# Patient Record
Sex: MALE | Race: OTHER
[De-identification: names, ages, dates, MRNs, and addresses within clinical notes are randomized per-mention and may not be internally consistent; named-entity substitution may affect disease eponyms.]

---

## 2017-08-24 ENCOUNTER — HOSPITAL ENCOUNTER (EMERGENCY)
Dept: HOSPITAL 61 - ER | Age: 22
Discharge: LEFT BEFORE DISCHARGE | End: 2017-08-24
Payer: SELF-PAY

## 2017-08-24 VITALS — DIASTOLIC BLOOD PRESSURE: 55 MMHG | SYSTOLIC BLOOD PRESSURE: 125 MMHG

## 2017-08-24 VITALS — BODY MASS INDEX: 41.47 KG/M2 | WEIGHT: 280 LBS | HEIGHT: 69 IN

## 2017-08-24 DIAGNOSIS — R11.2: Primary | ICD-10-CM

## 2017-08-24 DIAGNOSIS — Z87.01: ICD-10-CM

## 2017-08-24 DIAGNOSIS — E66.01: ICD-10-CM

## 2017-08-24 DIAGNOSIS — Z53.21: ICD-10-CM

## 2017-08-24 LAB
ALBUMIN SERPL-MCNC: 4 G/DL (ref 3.4–5)
ALBUMIN/GLOB SERPL: 0.9 {RATIO} (ref 1–1.7)
ALP SERPL-CCNC: 126 U/L (ref 46–116)
ALT SERPL-CCNC: 41 U/L (ref 16–63)
ANION GAP SERPL CALC-SCNC: 10 MMOL/L (ref 6–14)
APTT BLD: 29 SEC (ref 24–38)
AST SERPL-CCNC: 29 U/L (ref 15–37)
BACTERIA #/AREA URNS HPF: (no result) /HPF
BASOPHILS # BLD AUTO: 0.1 X10^3/UL (ref 0–0.2)
BASOPHILS NFR BLD: 1 % (ref 0–3)
BILIRUB SERPL-MCNC: 0.5 MG/DL (ref 0.2–1)
BILIRUB UR QL STRIP: NEGATIVE
BUN SERPL-MCNC: 12 MG/DL (ref 8–26)
BUN/CREAT SERPL: 12 (ref 6–20)
CALCIUM SERPL-MCNC: 9 MG/DL (ref 8.5–10.1)
CHLORIDE SERPL-SCNC: 102 MMOL/L (ref 98–107)
CO2 SERPL-SCNC: 27 MMOL/L (ref 21–32)
CREAT SERPL-MCNC: 1 MG/DL (ref 0.7–1.3)
EOSINOPHIL NFR BLD: 13 % (ref 0–3)
ERYTHROCYTE [DISTWIDTH] IN BLOOD BY AUTOMATED COUNT: 13.9 % (ref 11.5–14.5)
GFR SERPLBLD BASED ON 1.73 SQ M-ARVRAT: 94.3 ML/MIN
GLOBULIN SER-MCNC: 4.3 G/DL (ref 2.2–3.8)
GLUCOSE SERPL-MCNC: 95 MG/DL (ref 70–99)
GLUCOSE UR STRIP-MCNC: NEGATIVE MG/DL
HCT VFR BLD CALC: 46 % (ref 39–53)
HGB BLD-MCNC: 15.8 G/DL (ref 13–17.5)
INR PPP: 1.1 (ref 0.8–1.1)
LYMPHOCYTES # BLD: 2.1 X10^3/UL (ref 1–4.8)
LYMPHOCYTES NFR BLD AUTO: 26 % (ref 24–48)
MCH RBC QN AUTO: 26 PG (ref 25–35)
MCHC RBC AUTO-ENTMCNC: 34 G/DL (ref 31–37)
MCV RBC AUTO: 77 FL (ref 79–100)
MONOCYTES NFR BLD: 6 % (ref 0–9)
NEUTROPHILS NFR BLD AUTO: 54 % (ref 31–73)
NITRITE UR QL STRIP: NEGATIVE
PH UR STRIP: 6 [PH]
PLATELET # BLD AUTO: 262 X10^3/UL (ref 140–400)
POTASSIUM SERPL-SCNC: 3.9 MMOL/L (ref 3.5–5.1)
PROT SERPL-MCNC: 8.3 G/DL (ref 6.4–8.2)
PROT UR STRIP-MCNC: NEGATIVE MG/DL
PROTHROMBIN TIME: 13.7 SEC (ref 11.7–14)
RBC # BLD AUTO: 5.97 X10^6/UL (ref 4.3–5.7)
RBC #/AREA URNS HPF: 0 /HPF (ref 0–2)
SODIUM SERPL-SCNC: 139 MMOL/L (ref 136–145)
SP GR UR STRIP: >=1.03
SQUAMOUS #/AREA URNS LPF: (no result) /LPF
UROBILINOGEN UR-MCNC: 0.2 MG/DL
WBC # BLD AUTO: 8.2 X10^3/UL (ref 4–11)
WBC #/AREA URNS HPF: (no result) /HPF (ref 0–4)

## 2017-08-24 PROCEDURE — 83690 ASSAY OF LIPASE: CPT

## 2017-08-24 PROCEDURE — 80053 COMPREHEN METABOLIC PANEL: CPT

## 2017-08-24 PROCEDURE — 85730 THROMBOPLASTIN TIME PARTIAL: CPT

## 2017-08-24 PROCEDURE — 81001 URINALYSIS AUTO W/SCOPE: CPT

## 2017-08-24 PROCEDURE — 85025 COMPLETE CBC W/AUTO DIFF WBC: CPT

## 2017-08-24 PROCEDURE — 96361 HYDRATE IV INFUSION ADD-ON: CPT

## 2017-08-24 PROCEDURE — 85610 PROTHROMBIN TIME: CPT

## 2017-08-24 PROCEDURE — 99285 EMERGENCY DEPT VISIT HI MDM: CPT

## 2017-08-24 PROCEDURE — 96374 THER/PROPH/DIAG INJ IV PUSH: CPT

## 2017-08-24 PROCEDURE — 36415 COLL VENOUS BLD VENIPUNCTURE: CPT

## 2017-08-24 NOTE — PHYS DOC
Past Medical History


Past Medical History:  No Pertinent History


Additional Past Medical Histor:  pneumonia


Past Surgical History:  No Surgical History


Alcohol Use:  None


Drug Use:  None





Adult General


Chief Complaint


Chief Complaint:  HEMATEMESIS/VOMITING BLOOD





[facility]


HPI





22-year-old male with history of morbid obesity from [facility] now presents to 

the emergency department complaining of vomiting once and suspecting there 

might be a small amount of blood in his vomitus. She does not have a history of 

abdominal problems or chronic gastritis. He states he does not drink alcohol at 

all. Patient is on no medicines. He gets some nausea this morning and retched 

and vomited one time results of which were as described. No mable blood patient 

suspected trace of blood present. He denies black or bloody stool. He has no 

history of coagulopathy and does not take any anticoagulants area patient has 

no dizziness near syncope or syncope. Denies abdominal pain. He is currently 

completely asymptomatic with no nausea at all. Patient has never been told he 

has any medical problems





Review of Systems


Review of Systems





Constitutional: Denies fever or chills []


Eyes: Denies change in visual acuity, redness, or eye pain []


HENT: Denies nasal congestion or sore throat []


Respiratory: Denies cough or shortness of breath []


Cardiovascular: No additional information not addressed in HPI []


GI: Denies abdominal pain, nausea, vomiting, bloody stools or diarrhea []


: Denies dysuria or hematuria []


Musculoskeletal: Denies back pain or joint pain []


Integument: Denies rash or skin lesions []


Neurologic: Denies headache, focal weakness or sensory changes []


Endocrine: Denies polyuria or polydipsia []





Current Medications


Current Medications





Current Medications








 Medications


  (Trade)  Dose


 Ordered  Sig/Aurelia  Start Time


 Stop Time Status Last Admin


Dose Admin


 


 Ondansetron HCl


  (Zofran)  4 mg  1X  ONCE  8/24/17 10:00


 8/24/17 10:01 DC 8/24/17 10:12


4 MG


 


 Sodium Chloride  1,000 ml @ 


 999 mls/hr  Q1H1M  8/24/17 09:57


 8/24/17 10:57 DC 8/24/17 10:12


999 MLS/HR











Allergies


Allergies





Allergies








Coded Allergies Type Severity Reaction Last Updated Verified


 


  No Known Drug Allergies    5/29/16 No











Physical Exam


Physical Exam


Well-appearing morbidly obese crenation male in no acute distress. No pallor of 

conjunctiva. Nontender abdomen specifically no epigastric tenderness. Clear 

lungs regular rhythm no tachycardia completely benign extended abdominal exam 

with no CVA tenderness. Normal extremities


Constitutional: Well developed, well nourished, no acute distress, non-toxic 

appearance. []


HENT: Normocephalic, atraumatic, bilateral external ears normal, oropharynx 

moist, no oral exudates, nose normal. []


Eyes: PERRLA, EOMI, conjunctiva normal, no discharge. [] 


Neck: Normal range of motion, no tenderness, supple, no stridor. [] 


Cardiovascular:Heart rate regular rhythm, no murmur []


Lungs & Thorax:  Bilateral breath sounds clear to auscultation []


Abdomen: Bowel sounds normal, soft, no tenderness, no masses, no pulsatile 

masses. [] 


Skin: Warm, dry, no erythema, no rash. [] 


Back: No tenderness, no CVA tenderness. [] 


Extremities: No tenderness, no cyanosis, no clubbing, ROM intact, no edema. [] 


Neurologic: Alert and oriented X 3, normal motor function, normal sensory 

function, no focal deficits noted. []


Psychologic: Affect normal, judgement normal, mood normal. []





Current Patient Data


Vital Signs





 Vital Signs








  Date Time  Temp Pulse Resp B/P (MAP) Pulse Ox O2 Delivery O2 Flow Rate FiO2


 


8/24/17 12:07  70 16 125/55 (78) 98 Room Air  


 


8/24/17 09:12 98.4       





 98.4       








Lab Values





 Laboratory Tests








Test


  8/24/17


09:55 8/24/17


10:29 8/24/17


11:00


 


White Blood Count


  8.2 x10^3/uL


(4.0-11.0) 


  


 


 


Red Blood Count


  5.97 x10^6/uL


(4.30-5.70)  H 


  


 


 


Hemoglobin


  15.8 g/dL


(13.0-17.5) 


  


 


 


Hematocrit


  46.0 %


(39.0-53.0) 


  


 


 


Mean Corpuscular Volume


  77 fL ()


L 


  


 


 


Mean Corpuscular Hemoglobin 26 pg (25-35)    


 


Mean Corpuscular Hemoglobin


Concent 34 g/dL


(31-37) 


  


 


 


Red Cell Distribution Width


  13.9 %


(11.5-14.5) 


  


 


 


Platelet Count


  262 x10^3/uL


(140-400) 


  


 


 


Neutrophils (%) (Auto) 54 % (31-73)    


 


Lymphocytes (%) (Auto) 26 % (24-48)    


 


Monocytes (%) (Auto) 6 % (0-9)    


 


Eosinophils (%) (Auto) 13 % (0-3)  H  


 


Basophils (%) (Auto) 1 % (0-3)    


 


Neutrophils # (Auto)


  4.4 x10^3uL


(1.8-7.7) 


  


 


 


Lymphocytes # (Auto)


  2.1 x10^3/uL


(1.0-4.8) 


  


 


 


Monocytes # (Auto)


  0.5 x10^3/uL


(0.0-1.1) 


  


 


 


Eosinophils # (Auto)


  1.1 x10^3/uL


(0.0-0.7)  H 


  


 


 


Basophils # (Auto)


  0.1 x10^3/uL


(0.0-0.2) 


  


 


 


Sodium Level


  139 mmol/L


(136-145) 


  


 


 


Potassium Level


  3.9 mmol/L


(3.5-5.1) 


  


 


 


Chloride Level


  102 mmol/L


() 


  


 


 


Carbon Dioxide Level


  27 mmol/L


(21-32) 


  


 


 


Anion Gap 10 (6-14)    


 


Blood Urea Nitrogen


  12 mg/dL


(8-26) 


  


 


 


Creatinine


  1.0 mg/dL


(0.7-1.3) 


  


 


 


Estimated GFR


(Cockcroft-Gault) 94.3  


  


  


 


 


BUN/Creatinine Ratio 12 (6-20)    


 


Glucose Level


  95 mg/dL


(70-99) 


  


 


 


Calcium Level


  9.0 mg/dL


(8.5-10.1) 


  


 


 


Total Bilirubin


  0.5 mg/dL


(0.2-1.0) 


  


 


 


Aspartate Amino Transferase


(AST) 29 U/L (15-37)


  


  


 


 


Alanine Aminotransferase (ALT)


  41 U/L (16-63)


  


  


 


 


Alkaline Phosphatase


  126 U/L


()  H 


  


 


 


Total Protein


  8.3 g/dL


(6.4-8.2)  H 


  


 


 


Albumin


  4.0 g/dL


(3.4-5.0) 


  


 


 


Albumin/Globulin Ratio


  0.9 (1.0-1.7)


L 


  


 


 


Lipase


  99 U/L


() 


  


 


 


Prothrombin Time


  


  13.7 SEC


(11.7-14.0) 


 


 


Prothrombin Time INR  1.1 (0.8-1.1)   


 


PTT


  


  29 SEC (24-38)


  


 


 


Urine Color   Yellow  


 


Urine Clarity   Clear  


 


Urine pH   6.0  


 


Urine Specific Gravity   >=1.030  


 


Urine Protein


  


  


  Negative mg/dL


(NEG-TRACE)


 


Urine Glucose (UA)


  


  


  Negative mg/dL


(NEG)


 


Urine Ketones (Stick)


  


  


  Negative mg/dL


(NEG)


 


Urine Blood


  


  


  Negative (NEG)


 


 


Urine Nitrite


  


  


  Negative (NEG)


 


 


Urine Bilirubin


  


  


  Negative (NEG)


 


 


Urine Urobilinogen Dipstick


  


  


  0.2 mg/dL (0.2


mg/dL)


 


Urine Leukocyte Esterase


  


  


  Negative (NEG)


 


 


Urine RBC   0 /HPF (0-2)  


 


Urine WBC


  


  


  Occ /HPF (0-4)


 


 


Urine Squamous Epithelial


Cells 


  


  Few /LPF  


 


 


Urine Bacteria


  


  


  Few /HPF


(0-FEW)


 


Urine Mucus   Mod /LPF  





 Laboratory Tests


8/24/17 09:55








 Laboratory Tests


8/24/17 09:55














EKG


EKG


[]





Radiology/Procedures


Radiology/Procedures


[]





Course & Med Decision Making


Course & Med Decision Making


Pertinent Labs and Imaging studies reviewed. (See chart for details)


Patient status post single episode of vomiting. He now feels well. Question of 

small amount of blood in vomitus however patient has no history of coagulopathy 

or anticoagulation. He is refusing a rectal exam with nurse present. Explained 

to patient this was necessary for his workup as supportive information to 

suggest or deny that he may have gastritis or maybe evolving peptic ulcer 

disease. Despite excellent oxygenation patient continues to refuse rectal exam. 

Hemoglobin unremarkable, vital signs stable. Workup benign. Patient continues 

to refuse rectal exam and he is aware his workup is incomplete without it. He 

will sign out AGAINST MEDICAL ADVICE and follow-up with his doctor for 

reevaluation and further workup and treatment as needed. 





[]





Dragon Disclaimer


Dragon Disclaimer


This electronic medical record was generated, in whole or in part, using a 

voice recognition dictation system.





Departure


Departure


Impression:  


 Primary Impression:  


 Emesis


Disposition:  07 AGAINST MEDICAL ADVICE


Condition:  STABLE


Referrals:  


NO PCP (PCP)





Additional Instructions:  


All up with your doctor as soon as possible for reevaluation and further workup 

as needed











SUSY SEPULVEDA MD Aug 24, 2017 13:10

## 2018-12-02 ENCOUNTER — HOSPITAL ENCOUNTER (EMERGENCY)
Dept: HOSPITAL 61 - ER | Age: 23
LOS: 1 days | Discharge: HOME | End: 2018-12-03
Payer: SELF-PAY

## 2018-12-02 VITALS — WEIGHT: 280 LBS | HEIGHT: 72 IN | BODY MASS INDEX: 37.93 KG/M2

## 2018-12-02 DIAGNOSIS — M54.6: ICD-10-CM

## 2018-12-02 DIAGNOSIS — R00.0: ICD-10-CM

## 2018-12-02 DIAGNOSIS — R07.89: Primary | ICD-10-CM

## 2018-12-02 LAB
ALBUMIN SERPL-MCNC: 4.1 G/DL (ref 3.4–5)
ALBUMIN/GLOB SERPL: 0.8 {RATIO} (ref 1–1.7)
ALP SERPL-CCNC: 115 U/L (ref 46–116)
ALT SERPL-CCNC: 48 U/L (ref 16–63)
ANION GAP SERPL CALC-SCNC: 12 MMOL/L (ref 6–14)
AST SERPL-CCNC: 23 U/L (ref 15–37)
BASOPHILS # BLD AUTO: 0.1 X10^3/UL (ref 0–0.2)
BASOPHILS NFR BLD: 1 % (ref 0–3)
BILIRUB SERPL-MCNC: 0.5 MG/DL (ref 0.2–1)
BUN SERPL-MCNC: 13 MG/DL (ref 8–26)
BUN/CREAT SERPL: 13 (ref 6–20)
CALCIUM SERPL-MCNC: 9.9 MG/DL (ref 8.5–10.1)
CHLORIDE SERPL-SCNC: 100 MMOL/L (ref 98–107)
CK SERPL-CCNC: 202 U/L (ref 39–308)
CO2 SERPL-SCNC: 25 MMOL/L (ref 21–32)
CREAT SERPL-MCNC: 1 MG/DL (ref 0.7–1.3)
EOSINOPHIL NFR BLD: 0.3 X10^3/UL (ref 0–0.7)
EOSINOPHIL NFR BLD: 2 % (ref 0–3)
ERYTHROCYTE [DISTWIDTH] IN BLOOD BY AUTOMATED COUNT: 13.8 % (ref 11.5–14.5)
GFR SERPLBLD BASED ON 1.73 SQ M-ARVRAT: 93.4 ML/MIN
GLOBULIN SER-MCNC: 5.3 G/DL (ref 2.2–3.8)
GLUCOSE SERPL-MCNC: 95 MG/DL (ref 70–99)
HCT VFR BLD CALC: 51.2 % (ref 39–53)
HGB BLD-MCNC: 17.4 G/DL (ref 13–17.5)
LIPASE: 90 U/L (ref 73–393)
LYMPHOCYTES # BLD: 2.4 X10^3/UL (ref 1–4.8)
LYMPHOCYTES NFR BLD AUTO: 17 % (ref 24–48)
MCH RBC QN AUTO: 27 PG (ref 25–35)
MCHC RBC AUTO-ENTMCNC: 34 G/DL (ref 31–37)
MCV RBC AUTO: 79 FL (ref 79–100)
MONO #: 0.8 X10^3/UL (ref 0–1.1)
MONOCYTES NFR BLD: 6 % (ref 0–9)
NEUT #: 10.4 X10^3UL (ref 1.8–7.7)
NEUTROPHILS NFR BLD AUTO: 74 % (ref 31–73)
PLATELET # BLD AUTO: 325 X10^3/UL (ref 140–400)
POTASSIUM SERPL-SCNC: 3.8 MMOL/L (ref 3.5–5.1)
PROT SERPL-MCNC: 9.4 G/DL (ref 6.4–8.2)
RBC # BLD AUTO: 6.47 X10^6/UL (ref 4.3–5.7)
SODIUM SERPL-SCNC: 137 MMOL/L (ref 136–145)
WBC # BLD AUTO: 14 X10^3/UL (ref 4–11)

## 2018-12-02 PROCEDURE — 85025 COMPLETE CBC W/AUTO DIFF WBC: CPT

## 2018-12-02 PROCEDURE — 96374 THER/PROPH/DIAG INJ IV PUSH: CPT

## 2018-12-02 PROCEDURE — 82553 CREATINE MB FRACTION: CPT

## 2018-12-02 PROCEDURE — 99284 EMERGENCY DEPT VISIT MOD MDM: CPT

## 2018-12-02 PROCEDURE — 83690 ASSAY OF LIPASE: CPT

## 2018-12-02 PROCEDURE — 93005 ELECTROCARDIOGRAM TRACING: CPT

## 2018-12-02 PROCEDURE — 85379 FIBRIN DEGRADATION QUANT: CPT

## 2018-12-02 PROCEDURE — 80053 COMPREHEN METABOLIC PANEL: CPT

## 2018-12-02 PROCEDURE — 71046 X-RAY EXAM CHEST 2 VIEWS: CPT

## 2018-12-02 PROCEDURE — 36415 COLL VENOUS BLD VENIPUNCTURE: CPT

## 2018-12-02 PROCEDURE — 83880 ASSAY OF NATRIURETIC PEPTIDE: CPT

## 2018-12-02 PROCEDURE — 84484 ASSAY OF TROPONIN QUANT: CPT

## 2018-12-02 NOTE — PHYS DOC
Past Medical History


Past Medical History:  No Pertinent History


Additional Past Medical Histor:  pneumonia


Past Surgical History:  No Surgical History


Alcohol Use:  None


Drug Use:  None





Adult General


Chief Complaint


Chief Complaint:  CHEST PAIN





HPI


HPI





23-year-old male presents to ER via POV for complaints of sudden onset of left-

sided chest pain radiating into his back 2 hours prior to arrival. Patient's 

uncle at bedside is translating as patient is preferring not to use translation 

phone and he speaks primarily Chukese. Patient reports he was resting when pain 

started and pain has been constant. Pt denies increased pain w/movement or 

breathing. Pt denies fever, SOA, cough, abd pain, N/V, or palpitations. He 

denies swelling in extremities. He denies any recent travel or illness. Pt's 

brother denies any family hx of CAD/MIs. 





Pt took 800mg Ibuprofen PTA to ER. He denies smoking, etoh, or illicit drug use.





Review of Systems


Review of Systems





Constitutional: Denies fever or chills []


Eyes: Denies change in visual acuity, redness, or eye pain []


HENT: Denies nasal congestion or sore throat []


Respiratory: Denies cough or shortness of breath []


Cardiovascular: Reports lt side CP radiating into lt side upper back


GI: Denies abdominal pain, nausea, vomiting, bloody stools or diarrhea []


: Denies dysuria or hematuria []


Musculoskeletal: Denies neck pain or joint pain []


Integument: Denies rash, swelling or skin lesions []


Neurologic: Denies headache, focal weakness or sensory changes. Denies dizziness

/lightheadedness





All other systems were reviewed and found to be within normal limits, except as 

documented in this note.





Current Medications


Current Medications





Current Medications








 Medications


  (Trade)  Dose


 Ordered  Sig/Aurelia  Start Time


 Stop Time Status Last Admin


Dose Admin


 


 Ketorolac


 Tromethamine


  (Toradol 15mg


 Vial)  15 mg  1X  ONCE  12/2/18 23:45


 12/2/18 23:45 DC  





 


 Morphine Sulfate


  (Morphine


 Sulfate)  4 mg  1X  ONCE  12/2/18 23:55


 12/2/18 23:56 DC 12/3/18 00:03


4 MG


 


 Sodium Chloride  1,000 ml @ 


 1,000 mls/hr  1X  ONCE  12/2/18 23:30


 12/3/18 00:29 DC 12/2/18 23:30


1,000 MLS/HR











Allergies


Allergies





Allergies








Coded Allergies Type Severity Reaction Last Updated Verified


 


  No Known Drug Allergies    5/29/16 No











Physical Exam


Physical Exam





Constitutional: Well developed, well nourished, mild distress on initial exam- 

flushes facial skin tone w/clammy skin, non-toxic appearance. []


HENT: Normocephalic, atraumatic, bilateral ears normal, oropharynx moist, no 

oral exudates, nose normal. []


Eyes: PERRLA, conjunctiva normal, no discharge. [] 


Neck: Normal range of motion, no tenderness, supple, no stridor. [] 


Cardiovascular: Tachycardic heart rate regular rhythm, no murmur []


Lungs & Thorax:  Bilateral breath sounds clear to auscultation. Resp. equal/

nonlabored


Abdomen: Bowel sounds normal, soft/obese, no tenderness/rigidity, no masses, no 

pulsatile masses. [] 


Skin: Warm, clammy, no erythema, no rash. [] 


Back: No tenderness, no CVA tenderness. [] 


Extremities: No tenderness, no cyanosis, no clubbing, ROM intact, no edema. 

Bilat. radial 2+ equal. Dorsal pedis/pedal pulse equal bilat. 2+. Calf size 

symmetric bilat. with no calf tenderness


Neurologic: Alert and oriented X 3, normal motor function, normal sensory 

function, no focal deficits noted. []


Psychologic: Affect normal, judgement normal, mood normal. []





Current Patient Data


Vital Signs





 Vital Signs








  Date Time  Temp Pulse Resp B/P (MAP) Pulse Ox O2 Delivery O2 Flow Rate FiO2


 


12/3/18 01:31  73 18 143/77 (99) 95 Room Air  


 


12/2/18 22:15 98.6       





 98.6       








Lab Values





 Laboratory Tests








Test


 12/2/18


22:50


 


White Blood Count


 14.0 x10^3/uL


(4.0-11.0)  H


 


Red Blood Count


 6.47 x10^6/uL


(4.30-5.70)  H


 


Hemoglobin


 17.4 g/dL


(13.0-17.5)


 


Hematocrit


 51.2 %


(39.0-53.0)


 


Mean Corpuscular Volume


 79 fL ()





 


Mean Corpuscular Hemoglobin 27 pg (25-35)  


 


Mean Corpuscular Hemoglobin


Concent 34 g/dL


(31-37)


 


Red Cell Distribution Width


 13.8 %


(11.5-14.5)


 


Platelet Count


 325 x10^3/uL


(140-400)


 


Neutrophils (%) (Auto) 74 % (31-73)  H


 


Lymphocytes (%) (Auto) 17 % (24-48)  L


 


Monocytes (%) (Auto) 6 % (0-9)  


 


Eosinophils (%) (Auto) 2 % (0-3)  


 


Basophils (%) (Auto) 1 % (0-3)  


 


Neutrophils # (Auto)


 10.4 x10^3uL


(1.8-7.7)  H


 


Lymphocytes # (Auto)


 2.4 x10^3/uL


(1.0-4.8)


 


Monocytes # (Auto)


 0.8 x10^3/uL


(0.0-1.1)


 


Eosinophils # (Auto)


 0.3 x10^3/uL


(0.0-0.7)


 


Basophils # (Auto)


 0.1 x10^3/uL


(0.0-0.2)


 


D-Dimer (Sarita)


 < 0.27


ug/mlFEU


 


Sodium Level


 137 mmol/L


(136-145)


 


Potassium Level


 3.8 mmol/L


(3.5-5.1)


 


Chloride Level


 100 mmol/L


()


 


Carbon Dioxide Level


 25 mmol/L


(21-32)


 


Anion Gap 12 (6-14)  


 


Blood Urea Nitrogen


 13 mg/dL


(8-26)


 


Creatinine


 1.0 mg/dL


(0.7-1.3)


 


Estimated GFR


(Cockcroft-Gault) 93.4  





 


BUN/Creatinine Ratio 13 (6-20)  


 


Glucose Level


 95 mg/dL


(70-99)


 


Calcium Level


 9.9 mg/dL


(8.5-10.1)


 


Total Bilirubin


 0.5 mg/dL


(0.2-1.0)


 


Aspartate Amino Transferase


(AST) 23 U/L (15-37)





 


Alanine Aminotransferase (ALT)


 48 U/L (16-63)





 


Alkaline Phosphatase


 115 U/L


()


 


Creatine Kinase


 202 U/L


()


 


Creatine Kinase MB (Mass)


 1.4 ng/mL


(0.0-3.6)


 


Creatine Kinase MB Relative


Index 0.7 % (0-4)  





 


Troponin I Quantitative


 < 0.017 ng/mL


(0.000-0.055)


 


NT-Pro-B-Type Natriuretic


Peptide < 5 pg/mL


(0-124)


 


Total Protein


 9.4 g/dL


(6.4-8.2)  H


 


Albumin


 4.1 g/dL


(3.4-5.0)


 


Albumin/Globulin Ratio


 0.8 (1.0-1.7)


L


 


Lipase


 90 U/L


()





 Laboratory Tests


12/2/18 22:50








 Laboratory Tests


12/2/18 22:50














EKG


EKG


EKG obtained 12/02/18 at 2240


Interpreted by Dr. Yip





Sinus rhythm


Rate 100


No STEMI





Radiology/Procedures


Radiology/Procedures


CXR:  no acute findings





Course & Med Decision Making


Course & Med Decision Making


Pertinent Labs and Imaging studies reviewed. (See chart for details)





2325: On re-eval pt is in no visible distress and skin is not clammy as he was 

at time of arrival. He appears less anxious then at time of initial exam with 

equal/nonlabored resp. His /63 HR 93 RR 18 O2 sat 99% on RA. Test results 

were discussed with pt's brother translating again to pt. Will give pt pain meds

/IV fld bolus and continue to monitor. DDimer 0.27; EKG with no STEMI/acute ST 

elevation and troponin <0.017; WBCs 14.0 no bands. Pt continues to report pain 

to be same in left side chest denying any SOA, abd pain, or nausea. He reports 

pain does continue to radiate into lt side upper back. 





Pt's case was discussed with Dr. Yip who viewed chest xray- no obvious 

acute findings. 





0005: On re-eval. following IV Morphine pt reports no change in lt side CP. 

Discussed plans to recheck troponin 3 hrs after initial labs to further r/o ACS

- pt and family agreeable with this plan. 





0050: Re-eval and pt reports CP has subsided. He is resting on ER cart- in no 

distress/nontoxic in appearance. VS signs stable with /76 HR 88 RR 18 O2 

sat 98% RA- he remains on cardiac monitor with no ischemic changes. He denies 

any sxs at this time. Family at bedside. Discussed pt's case with Dr. Yip 

who will assume pt's care with 3 hr troponin pending. 








02:15:  Repeat trop/EKG negative for acute changes.  Patient discharged to 

home.  Advised to return to the ER for any new or worsening symptoms.





Dragon Disclaimer


Dragon Disclaimer


This electronic medical record was generated, in whole or in part, using a 

voice recognition dictation system.





Departure


Departure


Disposition:  01 HOME, SELF-CARE


Condition:  GOOD


Referrals:  


NO PCP (PCP)











LINDSAY GUERRA Dec 2, 2018 23:37


PARAMJIT YIP DO Dec 3, 2018 02:17

## 2018-12-03 ENCOUNTER — HOSPITAL ENCOUNTER (OUTPATIENT)
Dept: HOSPITAL 61 - ER | Age: 23
Setting detail: OBSERVATION
LOS: 1 days | Discharge: HOME | End: 2018-12-04
Attending: INTERNAL MEDICINE | Admitting: INTERNAL MEDICINE
Payer: SELF-PAY

## 2018-12-03 VITALS — SYSTOLIC BLOOD PRESSURE: 143 MMHG | DIASTOLIC BLOOD PRESSURE: 77 MMHG

## 2018-12-03 VITALS — HEIGHT: 68 IN | BODY MASS INDEX: 41.52 KG/M2 | WEIGHT: 274 LBS

## 2018-12-03 DIAGNOSIS — R07.89: Primary | ICD-10-CM

## 2018-12-03 DIAGNOSIS — F41.9: ICD-10-CM

## 2018-12-03 DIAGNOSIS — K76.0: ICD-10-CM

## 2018-12-03 LAB
ALBUMIN SERPL-MCNC: 3.7 G/DL (ref 3.4–5)
ALBUMIN/GLOB SERPL: 0.8 {RATIO} (ref 1–1.7)
ALP SERPL-CCNC: 101 U/L (ref 46–116)
ALT SERPL-CCNC: 47 U/L (ref 16–63)
ANION GAP SERPL CALC-SCNC: 11 MMOL/L (ref 6–14)
AST SERPL-CCNC: 26 U/L (ref 15–37)
BASOPHILS # BLD AUTO: 0.1 X10^3/UL (ref 0–0.2)
BASOPHILS NFR BLD: 1 % (ref 0–3)
BILIRUB SERPL-MCNC: 0.6 MG/DL (ref 0.2–1)
BUN SERPL-MCNC: 13 MG/DL (ref 8–26)
BUN/CREAT SERPL: 14 (ref 6–20)
CALCIUM SERPL-MCNC: 9.3 MG/DL (ref 8.5–10.1)
CHLORIDE SERPL-SCNC: 101 MMOL/L (ref 98–107)
CO2 SERPL-SCNC: 27 MMOL/L (ref 21–32)
CREAT SERPL-MCNC: 0.9 MG/DL (ref 0.7–1.3)
EOSINOPHIL NFR BLD: 0.6 X10^3/UL (ref 0–0.7)
EOSINOPHIL NFR BLD: 5 % (ref 0–3)
ERYTHROCYTE [DISTWIDTH] IN BLOOD BY AUTOMATED COUNT: 14.1 % (ref 11.5–14.5)
GFR SERPLBLD BASED ON 1.73 SQ M-ARVRAT: 105.5 ML/MIN
GLOBULIN SER-MCNC: 4.8 G/DL (ref 2.2–3.8)
GLUCOSE SERPL-MCNC: 81 MG/DL (ref 70–99)
HCT VFR BLD CALC: 47.5 % (ref 39–53)
HGB BLD-MCNC: 15.6 G/DL (ref 13–17.5)
LYMPHOCYTES # BLD: 2.6 X10^3/UL (ref 1–4.8)
LYMPHOCYTES NFR BLD AUTO: 23 % (ref 24–48)
MCH RBC QN AUTO: 26 PG (ref 25–35)
MCHC RBC AUTO-ENTMCNC: 33 G/DL (ref 31–37)
MCV RBC AUTO: 80 FL (ref 79–100)
MONO #: 0.7 X10^3/UL (ref 0–1.1)
MONOCYTES NFR BLD: 6 % (ref 0–9)
NEUT #: 7.4 X10^3UL (ref 1.8–7.7)
NEUTROPHILS NFR BLD AUTO: 65 % (ref 31–73)
PLATELET # BLD AUTO: 294 X10^3/UL (ref 140–400)
POTASSIUM SERPL-SCNC: 3.7 MMOL/L (ref 3.5–5.1)
PROT SERPL-MCNC: 8.5 G/DL (ref 6.4–8.2)
RBC # BLD AUTO: 5.94 X10^6/UL (ref 4.3–5.7)
SODIUM SERPL-SCNC: 139 MMOL/L (ref 136–145)
WBC # BLD AUTO: 11.4 X10^3/UL (ref 4–11)

## 2018-12-03 PROCEDURE — 36415 COLL VENOUS BLD VENIPUNCTURE: CPT

## 2018-12-03 PROCEDURE — G0379 DIRECT REFER HOSPITAL OBSERV: HCPCS

## 2018-12-03 PROCEDURE — 90471 IMMUNIZATION ADMIN: CPT

## 2018-12-03 PROCEDURE — 71260 CT THORAX DX C+: CPT

## 2018-12-03 PROCEDURE — 93005 ELECTROCARDIOGRAM TRACING: CPT

## 2018-12-03 PROCEDURE — 96374 THER/PROPH/DIAG INJ IV PUSH: CPT

## 2018-12-03 PROCEDURE — 96376 TX/PRO/DX INJ SAME DRUG ADON: CPT

## 2018-12-03 PROCEDURE — 80053 COMPREHEN METABOLIC PANEL: CPT

## 2018-12-03 PROCEDURE — G0378 HOSPITAL OBSERVATION PER HR: HCPCS

## 2018-12-03 PROCEDURE — 99284 EMERGENCY DEPT VISIT MOD MDM: CPT

## 2018-12-03 PROCEDURE — 90756 CCIIV4 VACC ABX FREE IM: CPT

## 2018-12-03 PROCEDURE — 84484 ASSAY OF TROPONIN QUANT: CPT

## 2018-12-03 PROCEDURE — 85025 COMPLETE CBC W/AUTO DIFF WBC: CPT

## 2018-12-03 NOTE — PHYS DOC
Past Medical History


Past Medical History:  No Pertinent History


Additional Past Medical Histor:  pneumonia


Past Surgical History:  No Surgical History


Alcohol Use:  None


Drug Use:  Marijuana





Adult General


Chief Complaint


Chief Complaint:  CHEST PAIN-NON CARDIAC NATURE





HPI


HPI





23-year-old male turns to ER via POV with complaints of left side chest pain 

radiating into his left upper back. Patient was evaluated in the ER by this 

provider last night and he presented with same symptoms. His uncle who is 

translating is at bedside states pain per pt has worsened . He reports patient 

took ibuprofen at 5 PM with no relief in symptoms.





Review of Systems


Review of Systems





Constitutional: Denies fever or chills []


Eyes: Denies change in visual acuity, redness, or eye pain []


HENT: Denies nasal congestion or sore throat []


Respiratory: Denies cough or shortness of breath []


Cardiovascular: Reports lt side CP into lt side upper back- pain is worse with 

deep breath. Some increased pain with palp. lt upper chest


GI: Denies abdominal pain, nausea, vomiting, bloody stools or diarrhea []


: Denies dysuria or hematuria []


Musculoskeletal: Denies neck pain or joint pain []


Integument: Denies rash, swelling or skin lesions []


Neurologic: Denies headache, focal weakness or sensory changes []








All other systems were reviewed and found to be within normal limits, except as 

documented in this note.





Current Medications


Current Medications





Current Medications








 Medications


  (Trade)  Dose


 Ordered  Sig/Aurelia  Start Time


 Stop Time Status Last Admin


Dose Admin


 


 Info


  (CONTRAST GIVEN


 -- Rx MONITORING)  1 each  PRN DAILY  PRN  12/3/18 21:00


 12/5/18 20:59   





 


 Iohexol


  (Omnipaque 300


 Mg/ml)  75 ml  1X  ONCE  12/3/18 20:45


 12/3/18 20:46 DC 12/3/18 20:45


75 ML


 


 Morphine Sulfate


  (Morphine


 Sulfate)  4 mg  1X  ONCE  12/3/18 20:45


 12/3/18 20:46 DC 12/3/18 21:34


4 MG


 


 Sodium Chloride  1,000 ml @ 


 1,000 mls/hr  1X  ONCE  12/3/18 20:45


 12/3/18 21:44 DC 12/3/18 21:34


1,000 MLS/HR











Allergies


Allergies





Allergies








Coded Allergies Type Severity Reaction Last Updated Verified


 


  No Known Drug Allergies    5/29/16 No











Physical Exam


Physical Exam





Constitutional: Well developed, well nourished, no acute distress, non-toxic 

appearance. []


HENT: Normocephalic, atraumatic, oropharynx moist, no oral exudates, nose 

normal. []


Eyes: PERRLA, EOMI, conjunctiva normal, no discharge. [] 


Neck: Normal range of motion, no tenderness, supple, no stridor. [] 


Cardiovascular: Heart rate regular rhythm, no murmur []


Lungs & Thorax:  Bilateral breath sounds clear to auscultation []


Abdomen: Bowel sounds normal, soft, no tenderness, no masses, no pulsatile 

masses. [] 


Skin: Warm, dry, no erythema, no rash. [] 


Back: No tenderness, no CVA tenderness. [] 


Extremities: No tenderness, no cyanosis, no clubbing, ROM intact, no edema. [] 


Neurologic: Alert and oriented X 3, normal motor function, normal sensory 

function, no focal deficits noted. []


Psychologic: Affect normal, judgement normal, mood normal. []





Current Patient Data


Vital Signs





 Vital Signs








  Date Time  Temp Pulse Resp B/P (MAP) Pulse Ox O2 Delivery O2 Flow Rate FiO2


 


12/3/18 21:38  79 14 155/84 (107) 98 Room Air  


 


12/3/18 20:22 98.4       





 98.4       








Lab Values





 Laboratory Tests








Test


 12/3/18


21:35


 


White Blood Count


 11.4 x10^3/uL


(4.0-11.0)  H


 


Red Blood Count


 5.94 x10^6/uL


(4.30-5.70)  H


 


Hemoglobin


 15.6 g/dL


(13.0-17.5)


 


Hematocrit


 47.5 %


(39.0-53.0)


 


Mean Corpuscular Volume


 80 fL ()





 


Mean Corpuscular Hemoglobin 26 pg (25-35)  


 


Mean Corpuscular Hemoglobin


Concent 33 g/dL


(31-37)


 


Red Cell Distribution Width


 14.1 %


(11.5-14.5)


 


Platelet Count


 294 x10^3/uL


(140-400)


 


Neutrophils (%) (Auto) 65 % (31-73)  


 


Lymphocytes (%) (Auto) 23 % (24-48)  L


 


Monocytes (%) (Auto) 6 % (0-9)  


 


Eosinophils (%) (Auto) 5 % (0-3)  H


 


Basophils (%) (Auto) 1 % (0-3)  


 


Neutrophils # (Auto)


 7.4 x10^3uL


(1.8-7.7)


 


Lymphocytes # (Auto)


 2.6 x10^3/uL


(1.0-4.8)


 


Monocytes # (Auto)


 0.7 x10^3/uL


(0.0-1.1)


 


Eosinophils # (Auto)


 0.6 x10^3/uL


(0.0-0.7)


 


Basophils # (Auto)


 0.1 x10^3/uL


(0.0-0.2)


 


Sodium Level


 139 mmol/L


(136-145)


 


Potassium Level


 3.7 mmol/L


(3.5-5.1)


 


Chloride Level


 101 mmol/L


()


 


Carbon Dioxide Level


 27 mmol/L


(21-32)


 


Anion Gap 11 (6-14)  


 


Blood Urea Nitrogen


 13 mg/dL


(8-26)


 


Creatinine


 0.9 mg/dL


(0.7-1.3)


 


Estimated GFR


(Cockcroft-Gault) 105.5  





 


BUN/Creatinine Ratio 14 (6-20)  


 


Glucose Level


 81 mg/dL


(70-99)


 


Calcium Level


 9.3 mg/dL


(8.5-10.1)


 


Total Bilirubin


 0.6 mg/dL


(0.2-1.0)


 


Aspartate Amino Transferase


(AST) 26 U/L (15-37)





 


Alanine Aminotransferase (ALT)


 47 U/L (16-63)





 


Alkaline Phosphatase


 101 U/L


()


 


Troponin I Quantitative


 < 0.017 ng/mL


(0.000-0.055)


 


Total Protein


 8.5 g/dL


(6.4-8.2)  H


 


Albumin


 3.7 g/dL


(3.4-5.0)


 


Albumin/Globulin Ratio


 0.8 (1.0-1.7)


L





 Laboratory Tests


12/3/18 21:35








 Laboratory Tests


12/3/18 21:35











EKG


EKG


EKG obtained 12/3/18 


Interpreted by Dr. Yip





Sinus rhythm


Nonspecific ST- T wave abnorm.


Rate 86


No STEMI





Similar to comparison with EKGs from 12/2/18





Radiology/Procedures


Radiology/Procedures


PROCEDURE: CT CHEST W/CONTRAST





CT chest with contrast dated 12/3/2018.


 


No comparison available.


 


Clinical data indication: Left-sided chest pain.


 


TECHNIQUE:


 


Contiguous axial imaging of the chest performed following the intravenous 


administration of 75 cc Omnipaque 300. 


One or more of the following individualized dose reduction techniques were


utilized for this examination:  


1. Automated exposure control  


2. Adjustment of the mA and/or kV according to patient size  


3. Use of iterative reconstruction technique.


 


FINDINGS:


 


Heart size within normal limits. No pericardial effusion. No mediastinal, 


hilar or axillary lymphadenopathy. There is some increased density in the 


anterior superior mediastinum that likely represents residual thymic 


tissue.


 


Central airways are patent. Lungs are clear. No consolidation or pleural 


effusion. No pneumothorax.


 


Images of upper abdomen show diffuse low-density of the liver, compatible 


with fatty infiltration. No apparent mass. Biliary tree normal in caliber.


 


Bone windows show no acute findings.


 


IMPRESSION:


1. No acute abnormality of chest. Clear lungs.


2. Mild fatty infiltration of the liver.


 


Electronically signed by: Peter Diaz MD (12/3/2018 9:10 PM) 


[facility]














DICTATED and SIGNED BY:     PETER DIAZ MD


DATE:     12/03/18 2107





Course & Med Decision Making


Course & Med Decision Making


Pertinent Labs and Imaging studies reviewed. (See chart for details)





2300: There was delay with lab in getting pt's lab results- EKG similar to 

those obtained during last night's ER visit and troponin again was <0.017. 

Chest CT with no acute findings. Results were discussed with pt and his uncle- 

they have concerns of ongoing pain and are wanting admit to hosp. as discussion 

was had with outpt care with PCP. At this time pt is unemployed and has no PCP. 

Discussed providing pain med and Rx for steroids- Uncle and pt both are wanting 

him admitted as pt feels pain is worse and he has had no improvement with 

Ibuprofen or Morphine. During this discussion pt is in no visible distress/

nontoxic in appearance. Further discussed labs from today's ER visit and last 

night's along with xray/CT results with no acute findings. Both are not wanting 

pt to be discharge and admitted for further monitoring w/Uncle reporting pt's 

pain was worse today. Will admit to hospitalist and have cardiology see pt 

tomorrow. At time of admit pt was in no distress with equal/nonlabored resp. 





Pt's case and plan of care was discussed with Dr. Yip.





Matilde Disclaimer


Matilde Disclaimer


This electronic medical record was generated, in whole or in part, using a 

voice recognition dictation system.





Departure


Departure


Impression:  


 Primary Impression:  


 Chest pain


Disposition:  09 ADMITTED AS INPATIENT


Admitting Physician:  Loy Ohara


Condition:  STABLE


Referrals:  


NO PCP (PCP)











LINDSAY GUERRA Dec 3, 2018 20:33

## 2018-12-03 NOTE — RAD
CT chest with contrast dated 12/3/2018.

 

No comparison available.

 

Clinical data indication: Left-sided chest pain.

 

TECHNIQUE:

 

Contiguous axial imaging of the chest performed following the intravenous 

administration of 75 cc Omnipaque 300. 

One or more of the following individualized dose reduction techniques were

utilized for this examination:  

1. Automated exposure control  

2. Adjustment of the mA and/or kV according to patient size  

3. Use of iterative reconstruction technique.

 

FINDINGS:

 

Heart size within normal limits. No pericardial effusion. No mediastinal, 

hilar or axillary lymphadenopathy. There is some increased density in the 

anterior superior mediastinum that likely represents residual thymic 

tissue.

 

Central airways are patent. Lungs are clear. No consolidation or pleural 

effusion. No pneumothorax.

 

Images of upper abdomen show diffuse low-density of the liver, compatible 

with fatty infiltration. No apparent mass. Biliary tree normal in caliber.

 

Bone windows show no acute findings.

 

IMPRESSION:

1. No acute abnormality of chest. Clear lungs.

2. Mild fatty infiltration of the liver.

 

Electronically signed by: Peter Diaz MD (12/3/2018 9:10 PM) 

[facility]

## 2018-12-03 NOTE — RAD
Chest, 2 views, 12/2/2018:

 

HISTORY: Chest pain

 

Comparison is made to a study from 3/30/2016. The heart size and pulmonary

vascularity are normal. No pulmonary infiltrate is seen. There is no 

evidence of pleural fluid.

 

IMPRESSION: No acute cardiopulmonary abnormality is detected.

 

Electronically signed by: Rick Moritz, MD (12/3/2018 7:46 AM) [facility]

## 2018-12-03 NOTE — EKG
[facility]

              [address] [place] [address]

Test Date:    2018               Test Time:    02:07:38

Pat Name:     JANET MARIN               Department:   

Patient ID:   [id number]           Room:          

Gender:       M                        Technician:   

:          1995               Requested By: PARAMJIT CLAIRE

Order Number: [id number]           Reading MD:   Willy Funk

                                 Measurements

Intervals                              Axis          

Rate:         78                       P:            0

MT:           176                      QRS:          33

QRSD:         100                      T:            4

QT:           362                                    

QTc:          416                                    

                           Interpretive Statements

SINUS RHYTHM

NON SPECIFIC ST-T ABNORMALITY (ELEVATION)



Electronically Signed On 12-3-2018 9:17:32 CST by Willy Funk

## 2018-12-03 NOTE — EKG
[facility]

              [address] [place] [address]

Test Date:    2018               Test Time:    22:40:55

Pat Name:     JANET MARIN               Department:   

Patient ID:   [id number]           Room:          

Gender:       M                        Technician:   

:          1995               Requested By: LINDSAY GUERRA

Order Number: [id number]           Reading MD:   Willy Funk

                                 Measurements

Intervals                              Axis          

Rate:         100                      P:            28

IN:           164                      QRS:          48

QRSD:         90                       T:            15

QT:           334                                    

QTc:          434                                    

                           Interpretive Statements

SINUS RHYTHM

NO SPECIFIC ECG ABNORMALITIES



Electronically Signed On 12-3-2018 9:16:55 CST by Willy Funk

## 2018-12-04 VITALS — DIASTOLIC BLOOD PRESSURE: 79 MMHG | SYSTOLIC BLOOD PRESSURE: 134 MMHG

## 2018-12-04 VITALS — SYSTOLIC BLOOD PRESSURE: 128 MMHG | DIASTOLIC BLOOD PRESSURE: 67 MMHG

## 2018-12-04 VITALS — SYSTOLIC BLOOD PRESSURE: 137 MMHG | DIASTOLIC BLOOD PRESSURE: 77 MMHG

## 2018-12-04 VITALS — DIASTOLIC BLOOD PRESSURE: 81 MMHG | SYSTOLIC BLOOD PRESSURE: 155 MMHG

## 2018-12-04 VITALS — SYSTOLIC BLOOD PRESSURE: 154 MMHG | DIASTOLIC BLOOD PRESSURE: 71 MMHG

## 2018-12-04 VITALS — DIASTOLIC BLOOD PRESSURE: 57 MMHG | SYSTOLIC BLOOD PRESSURE: 137 MMHG

## 2018-12-04 NOTE — SSS
ADMIT DATE:  12/04/2018



CHIEF COMPLAINT:  Chest pain.



HISTORY OF PRESENT ILLNESS:  The patient is a pleasant 23-year-old healthy male

who presents with chest pain, rated at 7/10.  He has associated anxiety.  It has

been occurring off and on for a couple of days, got worse today.  He tried

taking some ibuprofen, but that did not seem to work, really made it worse. 

While in the ER, there was some concern he could have occult coronary event, so

he has been admitted to the ICU.  We have now consulted Cardiology.  They feel

like this is noncardiac.  We plan to discharge.



PAST MEDICAL HISTORY:  Pneumonia.



ALLERGIES:  None.



FAMILY HISTORY:  Benign.



SOCIAL HISTORY:  He does not drink, smoke or take drugs.



MEDICATIONS:  Reviewed.  He has no home medications.



REVIEW OF SYSTEMS:  

GENERAL:  No history of weight change, weakness or fevers.

SKIN:  No bruising, hair changes or rashes.

EYES:  No blurred, double or loss of vision.

NOSE AND THROAT:  No history of nosebleeds, hoarseness or sore throat.

HEART:  No history of palpitations, chest pain or shortness of breath on

exertion.

LUNGS:  Denies cough, hemoptysis, wheezing or shortness of breath.

GASTROINTESTINAL:  Denies changes in appetite, nausea, vomiting, diarrhea or

constipation.

GENITOURINARY:  No history of frequency, urgency, hesitancy or nocturia.

NEUROLOGIC:  Denies history of numbness, tingling, tremor or weakness.

PSYCHIATRIC:  No history of panic, anxiety or depression.

ENDOCRINE:  No history of heat or cold intolerance, polyuria or polydipsia.

EXTREMITIES:  Denies muscle weakness, joint pain, pain on walking or stiffness.



PHYSICAL EXAMINATION:

VITAL SIGNS:  Temperature afebrile, pulse 98, respirations 18, blood pressure

144/90.

GENERAL:  He is sleeping.  He awakens.

HEART:  Normal S1, S2.

LUNGS:  Clear.

ABDOMEN:  Soft, positive bowel sounds.

EXTREMITIES:  Trace edema.

SKIN:  No rashes.

ENDOCRINE:  No thyromegaly.

LYMPHATICS:  No cervical nodes.

HEMATOPOIETIC:  No bruising.

PSYCHIATRIC:  He is a little anxious.



LABORATORY DATA:  Troponin is 0.  Chest x-ray is negative.



ASSESSMENT AND PLAN:  Atypical chest pain, suspect gastroesophageal reflux

disease.  We are going to discharge the patient with close outpatient followup.



DISPOSITION:  Home.



ACTIVITY:  As tolerated.



DIET:  Low sodium.



MEDICATIONS:  Please see the MRAD.



TOTAL TIME:  31 minutes.

 



______________________________

MONO ROMERO DO



DR:  NANCY/graham  JOB#:  [id number] / [id number]

DD:  12/04/2018 11:05  DT:  12/04/2018 11:15

## 2018-12-04 NOTE — EKG
[facility]

              [address] [place] [address]

Test Date:    2018               Test Time:    20:29:28

Pat Name:     JANET MARIN               Department:   

Patient ID:   [id number]           Room:         103 1

Gender:       M                        Technician:   

:          1995               Requested By: LINDSAY GUERRA

Order Number: [id number]           Reading MD:   Emerson Simms MD

                                 Measurements

Intervals                              Axis          

Rate:         85                       P:            0

ID:           [id number]                      QRS:          47

QRSD:         96                       T:            5

QT:           348                                    

QTc:          419                                    

                           Interpretive Statements

SINUS RHYTHM



Electronically Signed On 2018 14:39:12 CST by Emerson Simms MD

## 2018-12-04 NOTE — PDOC2
CARDIAC CONSULT


DATE OF CONSULT


Date of Consult


DATE: 12/4/18 


TIME: 08:52





REASON FOR CONSULT


Reason for Consult:


Chest pain





REFERRING PHYSICIAN


Referring Physician:


Geni





SOURCE


Source:  Chart review, Patient





HISTORY OF PRESENT ILLNESS


HISTORY OF PRESENT ILLNESS


This is a 22 yo Mone[place]que male admitted for complains of chest pain.  He was 

here yesterday in ED and was released after pain was relieved amd was released.

  His uncle is in the room who provided me with details of his symptoms as he 

does not speak English.  He has been lifting weights.  No prior injury or 

falls.  No recent long distance travel, no leg swelling or pain or any hx of 

VTE nor childhood heart disease.  Reports that Sunday he walked for a long 

distance which he tolerated.  He was at home then started having this stretchy 

discomfort to his left chest that went around his left back.  No nausea or SOA.

  No recorded fever or chills. After getting discharged from ED his chest pain 

recurred again and came to ED and was given morphine and it helped.  Presently 

he denies any chest pain but his left chest and left back pain is reproducible 

with palpation. Denies any recreational drugs, ETOH or tobaccoism.





PAST SURGICAL HISTORY


Past Surgical History:  No pertinent history





FAMILY HISTORY


Family History


noncontributory to CV





SOCIAL HISTORY


Smoke:  No


ALCOHOL:  none


Drugs:  None


Lives:  with Family





CURRENT MEDICATIONS


CURRENT MEDICATIONS





Current Medications








 Medications


  (Trade)  Dose


 Ordered  Sig/Aurelia


 Route


 PRN Reason  Start Time


 Stop Time Status Last Admin


Dose Admin


 


 Sodium Chloride  1,000 ml @ 


 1,000 mls/hr  1X  ONCE


 IV


   12/3/18 20:45


 12/3/18 21:44 DC 12/3/18 21:34





 


 Morphine Sulfate


  (Morphine


 Sulfate)  4 mg  1X  ONCE


 IV


   12/3/18 20:45


 12/3/18 20:46 DC 12/3/18 21:34





 


 Iohexol


  (Omnipaque 300


 Mg/ml)  75 ml  1X  ONCE


 IV


   12/3/18 20:45


 12/3/18 20:46 DC 12/3/18 20:45





 


 Morphine Sulfate


  (Morphine


 Sulfate)  4 mg  PRN Q4HRS  PRN


 IV


 PAIN  12/4/18 00:30


 12/5/18 00:29  12/4/18 01:22














ALLERGIES


ALLERGIES:  


Coded Allergies:  


     No Known Drug Allergies (Unverified , 5/29/16)





ROS


Review of System


14 point ROS evaluated with pertinent positives noted per HPI





PHYSICAL EXAM


General:  Alert, Oriented X3, Cooperative, No acute distress


HEENT:  Atraumatic, Mucous membr. moist/pink


Lungs:  Clear to auscultation, Normal air movement


Heart:  Regular rate (SR), Normal S1, Normal S2, No murmurs


Abdomen:  Soft, No tenderness


Extremities:  No cyanosis, No edema


Skin:  No breakdown, No significant lesion


Neuro:  Normal speech, Sensation intact


Psych/Mental Status:  Mental status NL, Mood NL


MUSCULOSKELETAL:  Osteoarthritic changes both hands





VITALS


VITALS





Vital Signs








  Date Time  Temp Pulse Resp B/P (MAP) Pulse Ox O2 Delivery O2 Flow Rate FiO2


 


12/4/18 03:00 98.4 78 14 137/57 (83) 93 Room Air  





 98.4       











LABS


Lab:





Laboratory Tests








Test


 12/3/18


21:35


 


White Blood Count


 11.4 x10^3/uL


(4.0-11.0)


 


Red Blood Count


 5.94 x10^6/uL


(4.30-5.70)


 


Hemoglobin


 15.6 g/dL


(13.0-17.5)


 


Hematocrit


 47.5 %


(39.0-53.0)


 


Mean Corpuscular Volume 80 fL () 


 


Mean Corpuscular Hemoglobin 26 pg (25-35) 


 


Mean Corpuscular Hemoglobin


Concent 33 g/dL


(31-37)


 


Red Cell Distribution Width


 14.1 %


(11.5-14.5)


 


Platelet Count


 294 x10^3/uL


(140-400)


 


Neutrophils (%) (Auto) 65 % (31-73) 


 


Lymphocytes (%) (Auto) 23 % (24-48) 


 


Monocytes (%) (Auto) 6 % (0-9) 


 


Eosinophils (%) (Auto) 5 % (0-3) 


 


Basophils (%) (Auto) 1 % (0-3) 


 


Neutrophils # (Auto)


 7.4 x10^3uL


(1.8-7.7)


 


Lymphocytes # (Auto)


 2.6 x10^3/uL


(1.0-4.8)


 


Monocytes # (Auto)


 0.7 x10^3/uL


(0.0-1.1)


 


Eosinophils # (Auto)


 0.6 x10^3/uL


(0.0-0.7)


 


Basophils # (Auto)


 0.1 x10^3/uL


(0.0-0.2)


 


Sodium Level


 139 mmol/L


(136-145)


 


Potassium Level


 3.7 mmol/L


(3.5-5.1)


 


Chloride Level


 101 mmol/L


()


 


Carbon Dioxide Level


 27 mmol/L


(21-32)


 


Anion Gap 11 (6-14) 


 


Blood Urea Nitrogen


 13 mg/dL


(8-26)


 


Creatinine


 0.9 mg/dL


(0.7-1.3)


 


Estimated GFR


(Cockcroft-Gault) 105.5 





 


BUN/Creatinine Ratio 14 (6-20) 


 


Glucose Level


 81 mg/dL


(70-99)


 


Calcium Level


 9.3 mg/dL


(8.5-10.1)


 


Total Bilirubin


 0.6 mg/dL


(0.2-1.0)


 


Aspartate Amino Transf


(AST/SGOT) 26 U/L (15-37) 





 


Alanine Aminotransferase


(ALT/SGPT) 47 U/L (16-63) 





 


Alkaline Phosphatase


 101 U/L


()


 


Troponin I Quantitative


 < 0.017 ng/mL


(0.000-0.055)


 


Total Protein


 8.5 g/dL


(6.4-8.2)


 


Albumin


 3.7 g/dL


(3.4-5.0)


 


Albumin/Globulin Ratio 0.8 (1.0-1.7) 











ASSESSMENT/PLAN


ASSESSMENT/PLAN


Atypical CP: suspect MSK. 


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


Morbid obesity





Recommendations


1. NSAIDS per PCP. 


2. No further cardiac w/u.











JUANY JONES Dec 4, 2018 09:05

## 2018-12-09 ENCOUNTER — HOSPITAL ENCOUNTER (EMERGENCY)
Dept: HOSPITAL 61 - ER | Age: 23
LOS: 1 days | Discharge: HOME | End: 2018-12-10
Payer: SELF-PAY

## 2018-12-09 VITALS — WEIGHT: 274 LBS | BODY MASS INDEX: 39.22 KG/M2 | HEIGHT: 70 IN

## 2018-12-09 DIAGNOSIS — R09.1: Primary | ICD-10-CM

## 2018-12-09 LAB
ALBUMIN SERPL-MCNC: 3.9 G/DL (ref 3.4–5)
ALBUMIN/GLOB SERPL: 0.9 {RATIO} (ref 1–1.7)
ALP SERPL-CCNC: 89 U/L (ref 46–116)
ALT SERPL-CCNC: 86 U/L (ref 16–63)
ANION GAP SERPL CALC-SCNC: 9 MMOL/L (ref 6–14)
AST SERPL-CCNC: 62 U/L (ref 15–37)
BASOPHILS # BLD AUTO: 0.1 X10^3/UL (ref 0–0.2)
BASOPHILS NFR BLD: 1 % (ref 0–3)
BILIRUB SERPL-MCNC: 0.3 MG/DL (ref 0.2–1)
BUN SERPL-MCNC: 12 MG/DL (ref 8–26)
BUN/CREAT SERPL: 9 (ref 6–20)
CALCIUM SERPL-MCNC: 8.9 MG/DL (ref 8.5–10.1)
CHLORIDE SERPL-SCNC: 105 MMOL/L (ref 98–107)
CO2 SERPL-SCNC: 25 MMOL/L (ref 21–32)
CREAT SERPL-MCNC: 1.3 MG/DL (ref 0.7–1.3)
EOSINOPHIL NFR BLD: 0.4 X10^3/UL (ref 0–0.7)
EOSINOPHIL NFR BLD: 5 % (ref 0–3)
ERYTHROCYTE [DISTWIDTH] IN BLOOD BY AUTOMATED COUNT: 13.6 % (ref 11.5–14.5)
GFR SERPLBLD BASED ON 1.73 SQ M-ARVRAT: 69 ML/MIN
GLOBULIN SER-MCNC: 4.4 G/DL (ref 2.2–3.8)
GLUCOSE SERPL-MCNC: 105 MG/DL (ref 70–99)
HCT VFR BLD CALC: 43.6 % (ref 39–53)
HGB BLD-MCNC: 14.8 G/DL (ref 13–17.5)
LYMPHOCYTES # BLD: 2.2 X10^3/UL (ref 1–4.8)
LYMPHOCYTES NFR BLD AUTO: 23 % (ref 24–48)
MAGNESIUM SERPL-MCNC: 2.2 MG/DL (ref 1.8–2.4)
MCH RBC QN AUTO: 27 PG (ref 25–35)
MCHC RBC AUTO-ENTMCNC: 34 G/DL (ref 31–37)
MCV RBC AUTO: 79 FL (ref 79–100)
MONO #: 0.8 X10^3/UL (ref 0–1.1)
MONOCYTES NFR BLD: 8 % (ref 0–9)
NEUT #: 6.3 X10^3UL (ref 1.8–7.7)
NEUTROPHILS NFR BLD AUTO: 64 % (ref 31–73)
PLATELET # BLD AUTO: 311 X10^3/UL (ref 140–400)
POTASSIUM SERPL-SCNC: 3.5 MMOL/L (ref 3.5–5.1)
PROT SERPL-MCNC: 8.3 G/DL (ref 6.4–8.2)
RBC # BLD AUTO: 5.5 X10^6/UL (ref 4.3–5.7)
SODIUM SERPL-SCNC: 139 MMOL/L (ref 136–145)
WBC # BLD AUTO: 9.9 X10^3/UL (ref 4–11)

## 2018-12-09 PROCEDURE — 85379 FIBRIN DEGRADATION QUANT: CPT

## 2018-12-09 PROCEDURE — 80053 COMPREHEN METABOLIC PANEL: CPT

## 2018-12-09 PROCEDURE — 93005 ELECTROCARDIOGRAM TRACING: CPT

## 2018-12-09 PROCEDURE — 84484 ASSAY OF TROPONIN QUANT: CPT

## 2018-12-09 PROCEDURE — 83735 ASSAY OF MAGNESIUM: CPT

## 2018-12-09 PROCEDURE — 80307 DRUG TEST PRSMV CHEM ANLYZR: CPT

## 2018-12-09 PROCEDURE — 96374 THER/PROPH/DIAG INJ IV PUSH: CPT

## 2018-12-09 PROCEDURE — 36415 COLL VENOUS BLD VENIPUNCTURE: CPT

## 2018-12-09 PROCEDURE — 71045 X-RAY EXAM CHEST 1 VIEW: CPT

## 2018-12-09 PROCEDURE — 71250 CT THORAX DX C-: CPT

## 2018-12-09 PROCEDURE — 81001 URINALYSIS AUTO W/SCOPE: CPT

## 2018-12-09 PROCEDURE — 99284 EMERGENCY DEPT VISIT MOD MDM: CPT

## 2018-12-09 PROCEDURE — 83880 ASSAY OF NATRIURETIC PEPTIDE: CPT

## 2018-12-09 PROCEDURE — 96375 TX/PRO/DX INJ NEW DRUG ADDON: CPT

## 2018-12-09 PROCEDURE — 96376 TX/PRO/DX INJ SAME DRUG ADON: CPT

## 2018-12-09 PROCEDURE — 85025 COMPLETE CBC W/AUTO DIFF WBC: CPT

## 2018-12-10 VITALS — SYSTOLIC BLOOD PRESSURE: 124 MMHG | DIASTOLIC BLOOD PRESSURE: 66 MMHG

## 2018-12-10 LAB
AMORPH SED URNS QL MICRO: PRESENT /HPF
AMPHETAMINE/METHAMPHETAMINE: (no result)
APTT PPP: YELLOW S
BACTERIA #/AREA URNS HPF: 0 /HPF
BARBITURATES UR-MCNC: (no result) UG/ML
BENZODIAZ UR-MCNC: (no result) UG/L
BILIRUB UR QL STRIP: NEGATIVE
CANNABINOIDS UR-MCNC: (no result) UG/L
COCAINE UR-MCNC: (no result) NG/ML
FIBRINOGEN PPP-MCNC: (no result) MG/DL
METHADONE SERPL-MCNC: (no result) NG/ML
NITRITE UR QL STRIP: NEGATIVE
OPIATES UR-MCNC: (no result) NG/ML
PCP SERPL-MCNC: (no result) MG/DL
PH UR STRIP: 6.5 [PH]
PROT UR STRIP-MCNC: NEGATIVE MG/DL
RBC #/AREA URNS HPF: (no result) /HPF (ref 0–2)
SQUAMOUS #/AREA URNS LPF: (no result) /LPF
UROBILINOGEN UR-MCNC: 1 MG/DL
WBC #/AREA URNS HPF: (no result) /HPF (ref 0–4)

## 2018-12-10 NOTE — RAD
PORTABLE CHEST 1V

 

Clinical indications: CHEST PAIN, shortness of breath. 

 

COMPARISON: December 2, 2018.

 

Findings: No acute lung infiltrate or pleural effusion or pulmonary edema 

or lung mass or pneumothorax is seen.  The heart size, pulmonary 

vasculature, mediastinum and both hanane are unremarkable. 

 

Impression: No acute radiographic abnormality is seen.

 

Electronically signed by: Bhavesh Rider MD (12/10/2018 7:47 AM) [facility]

## 2018-12-10 NOTE — RAD
Examination: CT CHEST WO CONTRAST

 

History: CHEST PAIN. 

 

Comparison/Correlation: 12/3/2018 CT chest with contrast

 

Findings: Axial images of the chest were obtained without contrast. 

Sagittal and coronal reformatted images provided. Residual thymic tissue 

is seen. Few nonenlarged superior mediastinal lymph nodes are present 

similar to prior exam. No pericardial or pleural effusion. Tracheal 

bronchial tree is unremarkable. Minimal atelectasis involving mid and 

lower lung fields noted. No definite or suspicious infiltrate. No 

pulmonary nodule or mass. Bony structures are unremarkable.

 

Fatty infiltration of the liver is present. Moderate quantity of debris in

the stomach.

 

Impression:

No suspicious intrathoracic process.

 

Fatty infiltration of the liver.

 

Electronically signed by: Mega Simon MD (12/10/2018 2:25 AM) [facility]

## 2018-12-10 NOTE — EKG
[facility]

              [address] [place] [address]

Test Date:    2018               Test Time:    22:17:48

Pat Name:     JANET MARIN               Department:   

Patient ID:   [id number]           Room:          

Gender:       M                        Technician:   

:          1995               Requested By: CE COLLINS

Order Number: [id number]           Reading MD:     

                                 Measurements

Intervals                              Axis          

Rate:         81                       P:            103

WI:           178                      QRS:          44

QRSD:         88                       T:            9

QT:           344                                    

QTc:          404                                    

                           Interpretive Statements

SINUS RHYTHM

NON SPECIFIC ST-T ABNORMALITY (ELEVATION)

BORDERLINE ECG

No previous ECG available for comparison

## 2018-12-10 NOTE — PHYS DOC
Past Medical History


Past Medical History:  Other


Additional Past Medical Histor:  pneumonia


Past Surgical History:  No Surgical History


Alcohol Use:  None


Drug Use:  Marijuana





Adult General


Chief Complaint


Chief Complaint:  CHEST PAIN-NON CARDIAC NATURE





HPI


HPI





Patient is a 23-year-old male who presents with complaint of chest pain for the 

last week. Patient has been seen at this facility on numerous occasions as well 

as at  and had been diagnosed with pneumonia previously and prescribed 

antibiotics but patient did not fill prescription. Patient continues to 

complain of pain that he rates as severe and describes a sharp and stabbing. He 

states the pain is worsened with breathing. He does have a dry cough. He denies 

any fever, nausea or vomiting.





Review of Systems


Review of Systems





Constitutional: Denies fever or chills []


Respiratory: Reports dry cough without shortness of breath []


Cardiovascular: No additional information not addressed in HPI []


GI: Denies abdominal pain, nausea, vomiting, bloody stools or diarrhea []


Integument: Denies rash or skin lesions []





All other systems were reviewed and found to be within normal limits, except as 

documented in this note.





Current Medications


Current Medications





Current Medications








 Medications


  (Trade)  Dose


 Ordered  Sig/Aurelia  Start Time


 Stop Time Status Last Admin


Dose Admin


 


 Fentanyl Citrate


  (Fentanyl 2ml


 Vial)  50 mcg  1X  ONCE  12/10/18 01:00


 12/10/18 01:01 DC 12/10/18 00:53


50 MCG


 


 Info


  (CONTRAST GIVEN


 -- Rx MONITORING)  1 each  PRN DAILY  PRN  12/10/18 00:15


 12/12/18 00:14   





 


 Iohexol


  (Omnipaque 300


 Mg/ml)  75 ml  1X  ONCE  12/10/18 00:30


 12/10/18 00:31 DC  





 


 Ondansetron HCl


  (Zofran)  4 mg  1X  ONCE  12/9/18 23:00


 12/9/18 23:01 DC 12/9/18 22:45


4 MG


 


 Sodium Chloride  1,000 ml @ 


 100 mls/hr  Q10H  12/9/18 23:00


 12/10/18 08:59  12/9/18 22:44


100 MLS/HR











Allergies


Allergies





Allergies








Coded Allergies Type Severity Reaction Last Updated Verified


 


  No Known Drug Allergies    5/29/16 No











Physical Exam


Physical Exam





Constitutional: Well developed, well nourished, no acute distress, non-toxic 

appearance. []


HENT: Normocephalic, atraumatic, bilateral external ears normal, oropharynx 

moist, no oral exudates, nose normal. []


Eyes: PERRLA, EOMI, conjunctiva normal, no discharge. [] 


Neck: Normal range of motion, no tenderness, supple. [] 


Cardiovascular: Regular rate and rhythm[]


Lungs & Thorax:  Bilateral breath sounds clear to auscultation []


Abdomen: Bowel sounds normal, soft, no tenderness. [] 


Skin: Warm, dry, no erythema, no rash. [] 


Extremities: No tenderness, no cyanosis, no clubbing, ROM intact, no edema. [] 


Neurologic: Alert and oriented X 3, normal motor function, normal sensory 

function, no focal deficits noted. []





Current Patient Data


Vital Signs





 Vital Signs








  Date Time  Temp Pulse Resp B/P (MAP) Pulse Ox O2 Delivery O2 Flow Rate FiO2


 


12/10/18 00:53   18  97 Room Air  


 


12/10/18 00:15  69  109/52 (71)    


 


12/9/18 22:10 98.7       





 98.7       








Lab Values





 Laboratory Tests








Test


 12/9/18


22:20


 


White Blood Count


 9.9 x10^3/uL


(4.0-11.0)


 


Red Blood Count


 5.50 x10^6/uL


(4.30-5.70)


 


Hemoglobin


 14.8 g/dL


(13.0-17.5)


 


Hematocrit


 43.6 %


(39.0-53.0)


 


Mean Corpuscular Volume


 79 fL ()





 


Mean Corpuscular Hemoglobin 27 pg (25-35)  


 


Mean Corpuscular Hemoglobin


Concent 34 g/dL


(31-37)


 


Red Cell Distribution Width


 13.6 %


(11.5-14.5)


 


Platelet Count


 311 x10^3/uL


(140-400)


 


Neutrophils (%) (Auto) 64 % (31-73)  


 


Lymphocytes (%) (Auto) 23 % (24-48)  L


 


Monocytes (%) (Auto) 8 % (0-9)  


 


Eosinophils (%) (Auto) 5 % (0-3)  H


 


Basophils (%) (Auto) 1 % (0-3)  


 


Neutrophils # (Auto)


 6.3 x10^3uL


(1.8-7.7)


 


Lymphocytes # (Auto)


 2.2 x10^3/uL


(1.0-4.8)


 


Monocytes # (Auto)


 0.8 x10^3/uL


(0.0-1.1)


 


Eosinophils # (Auto)


 0.4 x10^3/uL


(0.0-0.7)


 


Basophils # (Auto)


 0.1 x10^3/uL


(0.0-0.2)


 


D-Dimer (Sarita)


 0.70 ug/mlFEU


(0.00-0.50)  H


 


Sodium Level


 139 mmol/L


(136-145)


 


Potassium Level


 3.5 mmol/L


(3.5-5.1)


 


Chloride Level


 105 mmol/L


()


 


Carbon Dioxide Level


 25 mmol/L


(21-32)


 


Anion Gap 9 (6-14)  


 


Blood Urea Nitrogen


 12 mg/dL


(8-26)


 


Creatinine


 1.3 mg/dL


(0.7-1.3)


 


Estimated GFR


(Cockcroft-Gault) 69.0  





 


BUN/Creatinine Ratio 9 (6-20)  


 


Glucose Level


 105 mg/dL


(70-99)  H


 


Calcium Level


 8.9 mg/dL


(8.5-10.1)


 


Magnesium Level


 2.2 mg/dL


(1.8-2.4)


 


Total Bilirubin


 0.3 mg/dL


(0.2-1.0)


 


Aspartate Amino Transferase


(AST) 62 U/L (15-37)


H


 


Alanine Aminotransferase (ALT)


 86 U/L (16-63)


H


 


Alkaline Phosphatase


 89 U/L


()


 


Troponin I Quantitative


 < 0.017 ng/mL


(0.000-0.055)


 


NT-Pro-B-Type Natriuretic


Peptide 45 pg/mL


(0-124)


 


Total Protein


 8.3 g/dL


(6.4-8.2)  H


 


Albumin


 3.9 g/dL


(3.4-5.0)


 


Albumin/Globulin Ratio


 0.9 (1.0-1.7)


L





 Laboratory Tests


12/9/18 22:20








 Laboratory Tests


12/9/18 22:20














EKG


EKG


[]





Radiology/Procedures


Radiology/Procedures


[]





Course & Med Decision Making


Course & Med Decision Making


Pertinent Labs and Imaging studies reviewed. (See chart for details)





[]





Dragon Disclaimer


Dragon Disclaimer


This electronic medical record was generated, in whole or in part, using a 

voice recognition dictation system.





Departure


Departure


Impression:  


 Primary Impression:  


 Pleurisy


Disposition:  01 HOME, SELF-CARE


Condition:  STABLE


Referrals:  


NO PCP (PCP)


Patient Instructions:  Pleurisy


Scripts


Tramadol Hcl (TRAMADOL HCL) 50 Mg Tablet


50 MG PO Q6HRS PRN for PAIN, #12 TAB


   Prov: CE COLLINS Jr. DO         12/10/18 


Azithromycin (ZITHROMAX) 250 Mg Tablet


1 PKG PO UD, #6 TAB


   Prov: CE COLLINS Jr. DO         12/10/18











CE COLLINS Jr. DO Dec 10, 2018 03:30